# Patient Record
Sex: MALE | Race: OTHER | HISPANIC OR LATINO | URBAN - METROPOLITAN AREA
[De-identification: names, ages, dates, MRNs, and addresses within clinical notes are randomized per-mention and may not be internally consistent; named-entity substitution may affect disease eponyms.]

---

## 2021-09-29 ENCOUNTER — EMERGENCY (EMERGENCY)
Facility: HOSPITAL | Age: 36
LOS: 1 days | Discharge: HOME | End: 2021-09-29
Admitting: EMERGENCY MEDICINE
Payer: COMMERCIAL

## 2021-09-29 VITALS
HEART RATE: 82 BPM | RESPIRATION RATE: 16 BRPM | OXYGEN SATURATION: 100 % | DIASTOLIC BLOOD PRESSURE: 69 MMHG | SYSTOLIC BLOOD PRESSURE: 106 MMHG | TEMPERATURE: 97 F

## 2021-09-29 VITALS
SYSTOLIC BLOOD PRESSURE: 120 MMHG | TEMPERATURE: 98 F | RESPIRATION RATE: 18 BRPM | WEIGHT: 149.91 LBS | HEIGHT: 62 IN | OXYGEN SATURATION: 96 % | HEART RATE: 83 BPM | DIASTOLIC BLOOD PRESSURE: 78 MMHG

## 2021-09-29 PROCEDURE — 99284 EMERGENCY DEPT VISIT MOD MDM: CPT

## 2021-09-29 NOTE — ED ADULT TRIAGE NOTE - CHIEF COMPLAINT QUOTE
[place]. Pt admits to alcohol use. Pupils dilated but reactive to light. Pt responsive to repeated verbal stimuli.

## 2021-09-29 NOTE — ED ADULT NURSE NOTE - OBJECTIVE STATEMENT
36y male presents to ED c/o AMS. Admitted to EMS +ETOH use. Dilated pupils on assessment. No s/sx of trauma. Pt denies pain. Pt is AOx1, uncooperative on assessment, incoherent speech.

## 2021-09-29 NOTE — ED PROVIDER NOTE - NS ED ROS FT
Review of Systems    Constitutional: (-) fever (-) weakness (-) diaphoresis   Cardiovascular: (-) chest pain  (-) palpitations  Respiratory: (-) SOB (-) cough   GI: (-) abdominal pain (-) N/V (-) diarrhea  Integumentary: (-) rash (-) redness   Neurological:  (-) focal deficit

## 2021-09-29 NOTE — ED PROVIDER NOTE - PATIENT PORTAL LINK FT
You can access the FollowMyHealth Patient Portal offered by [facility] by registering at the following website: [email or web address]. By joining [employer]’s FollowMyHealth portal, you will also be able to view your health information using other applications (apps) compatible with our system.
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
## 2021-10-01 DIAGNOSIS — R41.82 ALTERED MENTAL STATUS, UNSPECIFIED: ICD-10-CM

## 2021-10-01 DIAGNOSIS — F10.120 ALCOHOL ABUSE WITH INTOXICATION, UNCOMPLICATED: ICD-10-CM
